# Patient Record
Sex: MALE | Race: WHITE | URBAN - METROPOLITAN AREA
[De-identification: names, ages, dates, MRNs, and addresses within clinical notes are randomized per-mention and may not be internally consistent; named-entity substitution may affect disease eponyms.]

---

## 2023-05-08 ENCOUNTER — DOCUMENTATION ONLY (OUTPATIENT)
Dept: TRANSPLANT | Facility: CLINIC | Age: 73
End: 2023-05-08

## 2023-05-08 NOTE — PROGRESS NOTES
[facility] DATA REPORTING EPISODE CLEAN UP:  Kidney episode tracking updated to NOT FOLLOWED, in accordance with clinical follow up and historic tracking.  OTTR import.